# Patient Record
Sex: FEMALE | Race: BLACK OR AFRICAN AMERICAN | Employment: OTHER | URBAN - METROPOLITAN AREA
[De-identification: names, ages, dates, MRNs, and addresses within clinical notes are randomized per-mention and may not be internally consistent; named-entity substitution may affect disease eponyms.]

---

## 2022-05-24 ENCOUNTER — HOSPITAL ENCOUNTER (EMERGENCY)
Age: 27
Discharge: HOME | End: 2022-05-24
Attending: EMERGENCY MEDICINE

## 2022-05-24 VITALS
DIASTOLIC BLOOD PRESSURE: 75 MMHG | SYSTOLIC BLOOD PRESSURE: 122 MMHG | TEMPERATURE: 98.5 F | RESPIRATION RATE: 16 BRPM | BODY MASS INDEX: 28.52 KG/M2 | OXYGEN SATURATION: 99 % | HEART RATE: 118 BPM | HEIGHT: 62 IN | WEIGHT: 155 LBS

## 2022-05-24 DIAGNOSIS — N61.1 BREAST ABSCESS: Primary | ICD-10-CM

## 2022-05-24 PROCEDURE — 6370000000 HC RX 637 (ALT 250 FOR IP): Performed by: EMERGENCY MEDICINE

## 2022-05-24 PROCEDURE — 99283 EMERGENCY DEPT VISIT LOW MDM: CPT

## 2022-05-24 PROCEDURE — 10060 I&D ABSCESS SIMPLE/SINGLE: CPT

## 2022-05-24 RX ORDER — TRAMADOL HYDROCHLORIDE 50 MG/1
50 TABLET ORAL
Status: DISCONTINUED | OUTPATIENT
Start: 2022-05-24 | End: 2022-05-25 | Stop reason: HOSPADM

## 2022-05-24 RX ORDER — SULFAMETHOXAZOLE AND TRIMETHOPRIM 800; 160 MG/1; MG/1
1 TABLET ORAL
Status: DISCONTINUED | OUTPATIENT
Start: 2022-05-24 | End: 2022-05-25 | Stop reason: HOSPADM

## 2022-05-24 RX ORDER — SULFAMETHOXAZOLE AND TRIMETHOPRIM 800; 160 MG/1; MG/1
1 TABLET ORAL
Status: COMPLETED | OUTPATIENT
Start: 2022-05-24 | End: 2022-05-24

## 2022-05-24 RX ORDER — TRAMADOL HYDROCHLORIDE 50 MG/1
50 TABLET ORAL
Status: COMPLETED | OUTPATIENT
Start: 2022-05-24 | End: 2022-05-24

## 2022-05-24 RX ORDER — DICLOFENAC POTASSIUM 50 MG/1
50 TABLET, FILM COATED ORAL 2 TIMES DAILY
Qty: 14 TABLET | Refills: 0 | Status: SHIPPED | OUTPATIENT
Start: 2022-05-24 | End: 2022-05-31

## 2022-05-24 RX ORDER — SULFAMETHOXAZOLE AND TRIMETHOPRIM 800; 160 MG/1; MG/1
1 TABLET ORAL 2 TIMES DAILY
Qty: 14 TABLET | Refills: 0 | Status: SHIPPED | OUTPATIENT
Start: 2022-05-24 | End: 2022-05-31

## 2022-05-24 RX ADMIN — SULFAMETHOXAZOLE AND TRIMETHOPRIM 1 TABLET: 800; 160 TABLET ORAL at 22:23

## 2022-05-24 RX ADMIN — TRAMADOL HYDROCHLORIDE 50 MG: 50 TABLET, COATED ORAL at 22:23

## 2022-05-24 ASSESSMENT — PAIN DESCRIPTION - LOCATION
LOCATION: BREAST
LOCATION: BREAST

## 2022-05-24 ASSESSMENT — PAIN SCALES - GENERAL
PAINLEVEL_OUTOF10: 8

## 2022-05-24 ASSESSMENT — PAIN DESCRIPTION - DESCRIPTORS: DESCRIPTORS: TENDER

## 2022-05-24 ASSESSMENT — PAIN - FUNCTIONAL ASSESSMENT: PAIN_FUNCTIONAL_ASSESSMENT: 0-10

## 2022-05-24 ASSESSMENT — ENCOUNTER SYMPTOMS
VOMITING: 0
ROS SKIN COMMENTS: RIGHT BREAST ABSCESS
NAUSEA: 0
SHORTNESS OF BREATH: 0
COUGH: 0
COLOR CHANGE: 1

## 2022-05-24 ASSESSMENT — PAIN DESCRIPTION - ORIENTATION
ORIENTATION: RIGHT
ORIENTATION: RIGHT

## 2022-05-25 NOTE — PROGRESS NOTES
Lisa Hurtado MD   Bariatric & Advanced Laparoscopic Surgery & Endoscopy  [address] [facility] [address], [address] [facility]  [place]  Phone [phone]   Fax [phone]      Date of visit: 2022      Primary/Requesting provider: MEDARDO Carvajal CNP         Name: Leonardo Morgan      MRN: [id number]       : 1995       Age: 32 y.o. Sex: female        PCP: MEDARDO Carvajal CNP     CC:    Chief Complaint   Patient presents with    New Patient     NP - breast abscess       HPI:     Leonardo Morgan is a 32 y.o. female who presents for evaluation of right breast abscess. She reports going to the ER a 2 days ago. The right breast abscess was partially drained in the ER and she was prescribed oral antibiotics. She reports pain was 10/10 and is now 1/10. She reports minimal drainage from site. No fever. No previous breast abscess. No family history of breast cancer. PMH:    History reviewed. No pertinent past medical history. PSH:    History reviewed. No pertinent surgical history. MEDS:    Current Outpatient Medications   Medication Sig Dispense Refill    ibuprofen (ADVIL;MOTRIN) 200 MG tablet Take 200 mg by mouth every 6 hours as needed for Pain      diclofenac (CATAFLAM) 50 MG tablet Take 1 tablet by mouth 2 times daily for 7 days 14 tablet 0    sulfamethoxazole-trimethoprim (BACTRIM DS;SEPTRA DS) 800-160 MG per tablet Take 1 tablet by mouth 2 times daily for 7 days 14 tablet 0     No current facility-administered medications for this visit. ALLERGIES:      No Known Allergies    SH:    Social History     Tobacco Use    Smoking status: Never Smoker    Smokeless tobacco: Never Used   Vaping Use    Vaping Use: Some days   Substance Use Topics    Alcohol use: Not on file    Drug use: Not on file       FH:    History reviewed. No pertinent family history.     Review of systems:  Review of Systems   Constitutional: Negative for chills, fatigue and fever. HENT: Negative for ear pain, mouth sores and sore throat. Eyes: Negative for discharge and itching. Respiratory: Negative for cough, chest tightness, shortness of breath and wheezing. Cardiovascular: Negative for chest pain and palpitations. Gastrointestinal: Negative for abdominal distention, abdominal pain, blood in stool and nausea. Endocrine: Negative for cold intolerance and heat intolerance. Genitourinary: Negative for hematuria, pelvic pain and vaginal pain. Musculoskeletal: Negative for back pain, joint swelling and neck pain. Skin: Negative for color change and rash. Allergic/Immunologic: Negative for environmental allergies and food allergies. Neurological: Negative for dizziness, weakness and headaches. Hematological: Negative for adenopathy. Psychiatric/Behavioral: Negative for agitation, behavioral problems and confusion. Physical Exam:     /77   Pulse (!) 125   Wt 148 lb 8 oz (67.4 kg)   BMI 27.16 kg/m²     General:  Well-developed, well-nourished, no distress. Psych:  Cooperative, good insight and judgement. Neuro:  Alert, oriented to person, place and time. HEENT:  Normocephalic, atraumatic. Sclera clear. Lungs:  Unlabored breathing. Symmetrical chest expansion. Right lower breast swelling and mild tenderness. There is a small opening and I was able to express about 15 cc of purulent fluid. Wound was dressed. Chest wall:  No tenderness or deformity. Heart:  Regular rate and rhythm. No JVD. Extremities:  Extremities normal, atraumatic, no cyanosis or edema. Skin:  Skin color, texture, turgor normal. No rashes. Diagnosis Orders   1. Abscess of right breast     2. Breast pain, right           Assessment/Plan:  Mirian Morgan is a 32 y.o. female who has signs and symptoms consistent with right breast abscess    Most fluid was drained here in the office.    Patient was instructed to wash area with soap and water and massage area when showering to express any remnant fluid. Continue bactrim until completed. Precautions given in case it worsens  FU in 5 days          Time: I spent 45 minutes preparing to see patient (including chart review and preparation), obtaining and/or reviewing additional medical history, performing a physical exam and evaluation, documenting clinical information in the electronic health record, independently interpreting results, communicating results to patient, family or caregiver, and/or coordinating care.       Signed: Kiara Warren MD  Bariatric & Minimally Invasive Surgery  5/26/2022 5:34 PM

## 2022-05-25 NOTE — ED TRIAGE NOTES
Pt c/o right side breast abscess X1 week. Pt reports she went to Urgent Care today and was told to come here. Pt reports she has a nipple piercing X5 years. Pt masked.

## 2022-05-25 NOTE — ED NOTES
I have reviewed discharge instructions with the patient. The patient verbalized understanding. Patient left ED via Discharge Method: ambulatory to Home with mom    Opportunity for questions and clarification provided. Patient given 2 scripts. To continue your aftercare when you leave the hospital, you may receive an automated call from our care team to check in on how you are doing. This is a free service and part of our promise to provide the best care and service to meet your aftercare needs.  If you have questions, or wish to unsubscribe from this service please call [phone]. Thank you for Choosing our [address] [place] [address] Emergency Department.       Felix Sanabria RN  05/24/22 0216

## 2022-05-26 ENCOUNTER — OFFICE VISIT (OUTPATIENT)
Dept: SURGERY | Age: 27
End: 2022-05-26

## 2022-05-26 VITALS
BODY MASS INDEX: 27.16 KG/M2 | WEIGHT: 148.5 LBS | HEART RATE: 125 BPM | DIASTOLIC BLOOD PRESSURE: 77 MMHG | SYSTOLIC BLOOD PRESSURE: 112 MMHG

## 2022-05-26 DIAGNOSIS — N64.4 BREAST PAIN, RIGHT: ICD-10-CM

## 2022-05-26 DIAGNOSIS — N61.1 ABSCESS OF RIGHT BREAST: Primary | ICD-10-CM

## 2022-05-26 PROCEDURE — 99204 OFFICE O/P NEW MOD 45 MIN: CPT | Performed by: SURGERY

## 2022-05-26 RX ORDER — IBUPROFEN 200 MG
200 TABLET ORAL EVERY 6 HOURS PRN
COMMUNITY

## 2022-05-26 ASSESSMENT — ENCOUNTER SYMPTOMS
ABDOMINAL DISTENTION: 0
SHORTNESS OF BREATH: 0
BACK PAIN: 0
SORE THROAT: 0
COLOR CHANGE: 0
ABDOMINAL PAIN: 0
WHEEZING: 0
NAUSEA: 0
CHEST TIGHTNESS: 0
COUGH: 0
BLOOD IN STOOL: 0
EYE ITCHING: 0
EYE DISCHARGE: 0

## 2022-06-08 ASSESSMENT — ENCOUNTER SYMPTOMS
SHORTNESS OF BREATH: 0
WHEEZING: 0
COLOR CHANGE: 0
EYE ITCHING: 0
CHEST TIGHTNESS: 0
BLOOD IN STOOL: 0
EYE DISCHARGE: 0
NAUSEA: 0
COUGH: 0
ABDOMINAL PAIN: 0
BACK PAIN: 0
ABDOMINAL DISTENTION: 0
SORE THROAT: 0

## 2022-06-08 NOTE — PROGRESS NOTES
Zaki Herrmann MD   Bariatric & Advanced Laparoscopic Surgery & Endoscopy  [address] [facility] [address], [address] [facility]  [place]  Phone [phone]   Fax [phone]      Date of visit: 2022      Primary/Requesting provider: MEDARDO Faustin CNP         Name: Divya Morgan      MRN: [id number]       : 1995       Age: 32 y.o. Sex: female        PCP: MEDARDO Faustin CNP     CC:    Chief Complaint   Patient presents with    Follow-up     FU - Breast abscess        HPI:     Divya Morgan is a 32 y.o. female who presents for evaluation of right breast abscess. She reports going to the ER a 2 days ago. The right breast abscess was partially drained in the ER and she was prescribed oral antibiotics. She reports pain was 10/10 and is now 1/10. She reports minimal drainage from site. No fever. No previous breast abscess. No family history of breast cancer. 22 - She is here for follow up. She completed antibiotic course. She reports wound closed the day after she left clinic. She reports no drainage from site. Pain has improved but she still has some erythema in the area. No fever. PMH:    History reviewed. No pertinent past medical history. PSH:    History reviewed. No pertinent surgical history. MEDS:    Current Outpatient Medications   Medication Sig Dispense Refill    sulfamethoxazole-trimethoprim (BACTRIM DS;SEPTRA DS) 800-160 MG per tablet Take 1 tablet by mouth 2 times daily for 10 days 20 tablet 0    ibuprofen (ADVIL;MOTRIN) 200 MG tablet Take 200 mg by mouth every 6 hours as needed for Pain (Patient not taking: Reported on 2022)      diclofenac (CATAFLAM) 50 MG tablet Take 1 tablet by mouth 2 times daily for 7 days 14 tablet 0     No current facility-administered medications for this visit.         ALLERGIES:      No Known Allergies    SH:    Social History     Tobacco Use    Smoking status: Never Smoker    Smokeless tobacco: Never Used   Vaping Use    Vaping Use: Some days   Substance Use Topics    Alcohol use: Not on file    Drug use: Not on file       FH:    History reviewed. No pertinent family history. Review of systems:  Review of Systems   Constitutional: Negative for chills, fatigue and fever. HENT: Negative for ear pain, mouth sores and sore throat. Eyes: Negative for discharge and itching. Respiratory: Negative for cough, chest tightness, shortness of breath and wheezing. Cardiovascular: Negative for chest pain and palpitations. Gastrointestinal: Negative for abdominal distention, abdominal pain, blood in stool and nausea. Endocrine: Negative for cold intolerance and heat intolerance. Genitourinary: Negative for hematuria, pelvic pain and vaginal pain. Musculoskeletal: Negative for back pain, joint swelling and neck pain. Skin: Negative for color change and rash. Allergic/Immunologic: Negative for environmental allergies and food allergies. Neurological: Negative for dizziness, weakness and headaches. Hematological: Negative for adenopathy. Psychiatric/Behavioral: Negative for agitation, behavioral problems and confusion. Physical Exam:     /66   Pulse (!) 105   Wt 148 lb (67.1 kg)   BMI 27.07 kg/m²     General:  Well-developed, well-nourished, no distress. Psych:  Cooperative, good insight and judgement. Neuro:  Alert, oriented to person, place and time. HEENT:  Normocephalic, atraumatic. Sclera clear. Lungs:  Unlabored breathing. Symmetrical chest expansion. Right breast with erythema in the superiolateral aspect of the nipple with some associated induration. No open wounds. No drainage. Chest wall:  No tenderness or deformity. Heart:  Regular rate and rhythm. No JVD. Extremities:  Extremities normal, atraumatic, no cyanosis or edema. Skin:  Skin color, texture, turgor normal. No rashes. Diagnosis Orders   1.  Abscess of breast, right  US ASP BREAST CYST RIGHT   2. Cellulitis of right breast           Assessment/Plan:  Mirian Morgan is a 32 y.o. female who has signs and symptoms consistent with right breast abscess    Bactrim x 10 days - will be prescribe again  We will send for US and possible aspiration of abscess  RTC in 2 weeks   Precautions given in case it worsens      Time: I spent 30 minutes preparing to see patient (including chart review and preparation), obtaining and/or reviewing additional medical history, performing a physical exam and evaluation, documenting clinical information in the electronic health record, independently interpreting results, communicating results to patient, family or caregiver, and/or coordinating care.       Signed: Rabia Hutchison MD  Bariatric & Minimally Invasive Surgery  6/9/2022 10:21 AM

## 2022-06-09 ENCOUNTER — OFFICE VISIT (OUTPATIENT)
Dept: SURGERY | Age: 27
End: 2022-06-09

## 2022-06-09 ENCOUNTER — HOSPITAL ENCOUNTER (OUTPATIENT)
Dept: MAMMOGRAPHY | Age: 27
Discharge: HOME | End: 2022-06-12

## 2022-06-09 VITALS
DIASTOLIC BLOOD PRESSURE: 66 MMHG | HEART RATE: 105 BPM | WEIGHT: 148 LBS | SYSTOLIC BLOOD PRESSURE: 100 MMHG | BODY MASS INDEX: 27.07 KG/M2

## 2022-06-09 DIAGNOSIS — N61.0 CELLULITIS OF RIGHT BREAST: ICD-10-CM

## 2022-06-09 DIAGNOSIS — N61.1 ABSCESS OF BREAST, RIGHT: Primary | ICD-10-CM

## 2022-06-09 DIAGNOSIS — N61.1 ABSCESS OF BREAST, RIGHT: ICD-10-CM

## 2022-06-09 PROCEDURE — 99214 OFFICE O/P EST MOD 30 MIN: CPT | Performed by: SURGERY

## 2022-06-09 PROCEDURE — 87075 CULTR BACTERIA EXCEPT BLOOD: CPT

## 2022-06-09 PROCEDURE — 87205 SMEAR GRAM STAIN: CPT

## 2022-06-09 PROCEDURE — 87102 FUNGUS ISOLATION CULTURE: CPT

## 2022-06-09 PROCEDURE — 2500000003 HC RX 250 WO HCPCS: Performed by: SURGERY

## 2022-06-09 PROCEDURE — 19000 PUNCTURE ASPIR CYST BREAST: CPT

## 2022-06-09 RX ORDER — SULFAMETHOXAZOLE AND TRIMETHOPRIM 800; 160 MG/1; MG/1
1 TABLET ORAL 2 TIMES DAILY
Qty: 20 TABLET | Refills: 0 | Status: SHIPPED | OUTPATIENT
Start: 2022-06-09 | End: 2022-06-19

## 2022-06-09 RX ORDER — LIDOCAINE HYDROCHLORIDE 10 MG/ML
5 INJECTION, SOLUTION INFILTRATION; PERINEURAL ONCE
Status: COMPLETED | OUTPATIENT
Start: 2022-06-09 | End: 2022-06-09

## 2022-06-09 RX ADMIN — LIDOCAINE HYDROCHLORIDE 5 ML: 10 INJECTION, SOLUTION INFILTRATION; PERINEURAL at 13:15

## 2022-06-12 LAB
BACTERIA SPEC CULT: ABNORMAL
GRAM STN SPEC: ABNORMAL
GRAM STN SPEC: ABNORMAL
SERVICE CMNT-IMP: ABNORMAL

## 2022-06-17 LAB
BACTERIA SPEC CULT: NORMAL
SERVICE CMNT-IMP: NORMAL

## 2022-06-22 ENCOUNTER — OFFICE VISIT (OUTPATIENT)
Dept: SURGERY | Age: 27
End: 2022-06-22

## 2022-06-22 ENCOUNTER — ANESTHESIA EVENT (OUTPATIENT)
Dept: SURGERY | Age: 27
End: 2022-06-22

## 2022-06-22 VITALS
BODY MASS INDEX: 27.4 KG/M2 | SYSTOLIC BLOOD PRESSURE: 125 MMHG | HEART RATE: 96 BPM | WEIGHT: 149.8 LBS | DIASTOLIC BLOOD PRESSURE: 76 MMHG

## 2022-06-22 DIAGNOSIS — N61.0 CELLULITIS OF RIGHT BREAST: ICD-10-CM

## 2022-06-22 DIAGNOSIS — N64.4 BREAST PAIN, RIGHT: ICD-10-CM

## 2022-06-22 DIAGNOSIS — N61.1 ABSCESS OF BREAST, RIGHT: Primary | ICD-10-CM

## 2022-06-22 PROCEDURE — 99215 OFFICE O/P EST HI 40 MIN: CPT | Performed by: SURGERY

## 2022-06-22 RX ORDER — SULFAMETHOXAZOLE AND TRIMETHOPRIM 800; 160 MG/1; MG/1
1 TABLET ORAL 2 TIMES DAILY
COMMUNITY

## 2022-06-22 RX ORDER — CEPHALEXIN 500 MG/1
500 CAPSULE ORAL 4 TIMES DAILY
COMMUNITY

## 2022-06-22 RX ORDER — OXYCODONE HYDROCHLORIDE 5 MG/1
5 TABLET ORAL EVERY 8 HOURS PRN
COMMUNITY

## 2022-06-22 ASSESSMENT — ENCOUNTER SYMPTOMS
NAUSEA: 0
BLOOD IN STOOL: 0
SHORTNESS OF BREATH: 0
ABDOMINAL PAIN: 0
BACK PAIN: 0
ABDOMINAL DISTENTION: 0
EYE DISCHARGE: 0
COUGH: 0
WHEEZING: 0
CHEST TIGHTNESS: 0
SORE THROAT: 0
COLOR CHANGE: 0
EYE ITCHING: 0

## 2022-06-22 NOTE — H&P (VIEW-ONLY)
Emre Carr MD   Bariatric & Advanced Laparoscopic Surgery & Endoscopy  [facility] [facility] [address], [address] [facility]  [place]  Phone [phone]   Fax [phone]      Date of visit: 2022      Primary/Requesting provider: MEDARDO Salcido CNP         Name: Juliana Morgan      MRN: [id number]       : 1995       Age: 32 y.o. Sex: female        PCP: MEDARDO Salcido CNP     CC:    Chief Complaint   Patient presents with    Follow-up     FU - breast abscess       HPI:     Mirian Morgan is a 32 y.o. female who presents for evaluation of right breast abscess. She reports going to the ER a 2 days ago. The right breast abscess was partially drained in the ER and she was prescribed oral antibiotics. She reports pain was 10/10 and is now 1/10. She reports minimal drainage from site. No fever. No previous breast abscess. No family history of breast cancer. 22 - She is here for follow up. She completed antibiotic course. She reports wound closed the day after she left clinic. She reports no drainage from site. Pain has improved but she still has some erythema in the area. No fever. 2022 - Right breast fluid collection was aspirated by radiology. She did not take the antibiotics immediately. She went to urgent care a couple days ago with concern for recurrence. She was found to have a 3 cm fluid collection. They called our office and we scheduled her for outpatient I&D yesterday but when OR called her she had eaten fruits for breakfast. She was rescheduled for today. Today, she reports right breast is red, warm and very tender to palpation. No drainage. PMH:    History reviewed. No pertinent past medical history.     PSH:    Past Surgical History:   Procedure Laterality Date    US BREAST CYST ASPIRATION RIGHT Right 2022    US BREAST CYST ASPIRATION RIGHT 2022 Chata Jauregui MD [facility]       MEDS:    Current Outpatient Medications   Medication Sig Dispense Refill    cephALEXin (KEFLEX) 500 MG capsule Take 500 mg by mouth 4 times daily      oxyCODONE (ROXICODONE) 5 MG immediate release tablet Take 5 mg by mouth every 8 hours as needed for Pain.  sulfamethoxazole-trimethoprim (BACTRIM DS;SEPTRA DS) 800-160 MG per tablet Take 1 tablet by mouth 2 times daily      ibuprofen (ADVIL;MOTRIN) 200 MG tablet Take 200 mg by mouth every 6 hours as needed for Pain (Patient not taking: Reported on 6/9/2022)      diclofenac (CATAFLAM) 50 MG tablet Take 1 tablet by mouth 2 times daily for 7 days 14 tablet 0     No current facility-administered medications for this visit. ALLERGIES:      No Known Allergies    SH:    Social History     Tobacco Use    Smoking status: Never Smoker    Smokeless tobacco: Never Used   Vaping Use    Vaping Use: Some days   Substance Use Topics    Alcohol use: Not on file    Drug use: Not on file       FH:    History reviewed. No pertinent family history. Review of systems:  Review of Systems   Constitutional: Negative for chills, fatigue and fever. HENT: Negative for ear pain, mouth sores and sore throat. Eyes: Negative for discharge and itching. Respiratory: Negative for cough, chest tightness, shortness of breath and wheezing. Cardiovascular: Negative for chest pain and palpitations. Gastrointestinal: Negative for abdominal distention, abdominal pain, blood in stool and nausea. Endocrine: Negative for cold intolerance and heat intolerance. Genitourinary: Negative for hematuria, pelvic pain and vaginal pain. Musculoskeletal: Negative for back pain, joint swelling and neck pain. Skin: Negative for color change and rash. Allergic/Immunologic: Negative for environmental allergies and food allergies. Neurological: Negative for dizziness, weakness and headaches. Hematological: Negative for adenopathy.    Psychiatric/Behavioral: Negative for agitation, behavioral problems and confusion. Physical Exam:     /76   Pulse 96   Wt 149 lb 12.8 oz (67.9 kg)   BMI 27.40 kg/m²     General:  Well-developed, well-nourished, no distress. Psych:  Cooperative, good insight and judgement. Neuro:  Alert, oriented to person, place and time. HEENT:  Normocephalic, atraumatic. Sclera clear. Lungs:  Unlabored breathing. Symmetrical chest expansion. Right breast with erythema present with upper areola fluctuance and tenderness. Chest wall:  No tenderness or deformity. Heart:  Regular rate and rhythm. No JVD. Extremities:  Extremities normal, atraumatic, no cyanosis or edema. Skin:  Skin color, texture, turgor normal. No rashes. Diagnosis Orders   1. Abscess of breast, right     2. Cellulitis of right breast     3. Breast pain, right           Assessment/Plan:  Mirian Morgan is a 32 y.o. female who has signs and symptoms consistent with right breast abscess    Continue Bactrim  Plan for I&D of right breast tomorrow in the OR as she will not tolerate office drainage due to pain  Risks and benefits discussed and she agreed to proceed. Time: I spent 40 minutes preparing to see patient (including chart review and preparation), obtaining and/or reviewing additional medical history, performing a physical exam and evaluation, documenting clinical information in the electronic health record, independently interpreting results, communicating results to patient, family or caregiver, and/or coordinating care.       Signed: Mario Adams MD  Bariatric & Minimally Invasive Surgery  6/22/2022 10:08 AM

## 2022-06-22 NOTE — PERIOP NOTE
Patient verified name and . Order for consent IS found in EHR and matches case posting; patient verifies procedure. Type 1b surgery, phone assessment complete. Orders not received. Labs per surgeon: none  Labs per anesthesia protocol: none    Patient answered medical/surgical history questions at their best of ability. All prior to admission medications documented in [facility]. Patient instructed to take the following medications the day of surgery according to anesthesia guidelines with a small sip of water: Keflex, Bactrim, Oxycodone prn   On the day before surgery please take Acetaminophen 1000mg in the morning and then again before bed. Hold all vitamins 7 days prior to surgery and NSAIDS 5 days prior to surgery. Prescription meds to hold: none  Patient instructed on the following:    > Arrive at [place], time of arrival to be called the day before by 1700  > NPO after midnight including gum, mints, and ice chips  > Responsible adult must drive patient to the hospital, stay during surgery, and patient will need supervision 24 hours after anesthesia  > Use antibacterial soap in shower the night before surgery and on the morning of surgery  > All piercings must be removed prior to arrival.    > Leave all valuables (money and jewelry) at home but bring insurance card and ID on DOS.   > You may be required to pay a deductible or co-pay on the day of your procedure. You can pre-pay by calling [phone] if your surgery is at the [facility] or [phone] if your surgery is at the [facility]. > Do not wear make-up, nail polish, lotions, cologne, perfumes, powders, or oil on skin. Artificial nails are not permitted.

## 2022-06-22 NOTE — PROGRESS NOTES
Marah Mckeon MD   Bariatric & Advanced Laparoscopic Surgery & Endoscopy  [address] [facility] [address], [address] [facility]  [place]  Phone [phone]   Fax [phone]      Date of visit: 2022      Primary/Requesting provider: MEDARDO Gonzalez CNP         Name: Ronny Morgan      MRN: [id number]       : 1995       Age: 32 y.o. Sex: female        PCP: MEDARDO Gonzalez CNP     CC:    Chief Complaint   Patient presents with    Follow-up     FU - breast abscess       HPI:     Mirian Morgan is a 32 y.o. female who presents for evaluation of right breast abscess. She reports going to the ER a 2 days ago. The right breast abscess was partially drained in the ER and she was prescribed oral antibiotics. She reports pain was 10/10 and is now 1/10. She reports minimal drainage from site. No fever. No previous breast abscess. No family history of breast cancer. 22 - She is here for follow up. She completed antibiotic course. She reports wound closed the day after she left clinic. She reports no drainage from site. Pain has improved but she still has some erythema in the area. No fever. 2022 - Right breast fluid collection was aspirated by radiology. She did not take the antibiotics immediately. She went to urgent care a couple days ago with concern for recurrence. She was found to have a 3 cm fluid collection. They called our office and we scheduled her for outpatient I&D yesterday but when OR called her she had eaten fruits for breakfast. She was rescheduled for today. Today, she reports right breast is red, warm and very tender to palpation. No drainage. PMH:    History reviewed. No pertinent past medical history.     PSH:    Past Surgical History:   Procedure Laterality Date    US BREAST CYST ASPIRATION RIGHT Right 2022    US BREAST CYST ASPIRATION RIGHT 2022 Hillary Ku MD [facility]       MEDS:    Current Outpatient Medications   Medication Sig Dispense Refill    cephALEXin (KEFLEX) 500 MG capsule Take 500 mg by mouth 4 times daily      oxyCODONE (ROXICODONE) 5 MG immediate release tablet Take 5 mg by mouth every 8 hours as needed for Pain.  sulfamethoxazole-trimethoprim (BACTRIM DS;SEPTRA DS) 800-160 MG per tablet Take 1 tablet by mouth 2 times daily      ibuprofen (ADVIL;MOTRIN) 200 MG tablet Take 200 mg by mouth every 6 hours as needed for Pain (Patient not taking: Reported on 6/9/2022)      diclofenac (CATAFLAM) 50 MG tablet Take 1 tablet by mouth 2 times daily for 7 days 14 tablet 0     No current facility-administered medications for this visit. ALLERGIES:      No Known Allergies    SH:    Social History     Tobacco Use    Smoking status: Never Smoker    Smokeless tobacco: Never Used   Vaping Use    Vaping Use: Some days   Substance Use Topics    Alcohol use: Not on file    Drug use: Not on file       FH:    History reviewed. No pertinent family history. Review of systems:  Review of Systems   Constitutional: Negative for chills, fatigue and fever. HENT: Negative for ear pain, mouth sores and sore throat. Eyes: Negative for discharge and itching. Respiratory: Negative for cough, chest tightness, shortness of breath and wheezing. Cardiovascular: Negative for chest pain and palpitations. Gastrointestinal: Negative for abdominal distention, abdominal pain, blood in stool and nausea. Endocrine: Negative for cold intolerance and heat intolerance. Genitourinary: Negative for hematuria, pelvic pain and vaginal pain. Musculoskeletal: Negative for back pain, joint swelling and neck pain. Skin: Negative for color change and rash. Allergic/Immunologic: Negative for environmental allergies and food allergies. Neurological: Negative for dizziness, weakness and headaches. Hematological: Negative for adenopathy.    Psychiatric/Behavioral: Negative for agitation, behavioral problems and confusion. Physical Exam:     /76   Pulse 96   Wt 149 lb 12.8 oz (67.9 kg)   BMI 27.40 kg/m²     General:  Well-developed, well-nourished, no distress. Psych:  Cooperative, good insight and judgement. Neuro:  Alert, oriented to person, place and time. HEENT:  Normocephalic, atraumatic. Sclera clear. Lungs:  Unlabored breathing. Symmetrical chest expansion. Right breast with erythema present with upper areola fluctuance and tenderness. Chest wall:  No tenderness or deformity. Heart:  Regular rate and rhythm. No JVD. Extremities:  Extremities normal, atraumatic, no cyanosis or edema. Skin:  Skin color, texture, turgor normal. No rashes. Diagnosis Orders   1. Abscess of breast, right     2. Cellulitis of right breast     3. Breast pain, right           Assessment/Plan:  Mirian Morgan is a 32 y.o. female who has signs and symptoms consistent with right breast abscess    Continue Bactrim  Plan for I&D of right breast tomorrow in the OR as she will not tolerate office drainage due to pain  Risks and benefits discussed and she agreed to proceed. Time: I spent 40 minutes preparing to see patient (including chart review and preparation), obtaining and/or reviewing additional medical history, performing a physical exam and evaluation, documenting clinical information in the electronic health record, independently interpreting results, communicating results to patient, family or caregiver, and/or coordinating care.       Signed: Billy Braun MD  Bariatric & Minimally Invasive Surgery  6/22/2022 10:08 AM

## 2022-06-23 ENCOUNTER — HOSPITAL ENCOUNTER (OUTPATIENT)
Age: 27
Setting detail: OUTPATIENT SURGERY
Discharge: HOME | End: 2022-06-23
Attending: SURGERY | Admitting: SURGERY

## 2022-06-23 ENCOUNTER — ANESTHESIA (OUTPATIENT)
Dept: SURGERY | Age: 27
End: 2022-06-23

## 2022-06-23 VITALS
WEIGHT: 147.6 LBS | OXYGEN SATURATION: 99 % | DIASTOLIC BLOOD PRESSURE: 65 MMHG | SYSTOLIC BLOOD PRESSURE: 119 MMHG | BODY MASS INDEX: 27.16 KG/M2 | HEART RATE: 81 BPM | HEIGHT: 62 IN | RESPIRATION RATE: 16 BRPM | TEMPERATURE: 97.6 F

## 2022-06-23 DIAGNOSIS — N61.1 ABSCESS OF RIGHT BREAST: Primary | ICD-10-CM

## 2022-06-23 LAB — HCG UR QL: NEGATIVE

## 2022-06-23 PROCEDURE — 6370000000 HC RX 637 (ALT 250 FOR IP): Performed by: ANESTHESIOLOGY

## 2022-06-23 PROCEDURE — 19020 MASTOTOMY EXPL DRG ABSC DP: CPT | Performed by: SURGERY

## 2022-06-23 PROCEDURE — 7100000010 HC PHASE II RECOVERY - FIRST 15 MIN: Performed by: SURGERY

## 2022-06-23 PROCEDURE — 6360000002 HC RX W HCPCS: Performed by: ANESTHESIOLOGY

## 2022-06-23 PROCEDURE — 7100000001 HC PACU RECOVERY - ADDTL 15 MIN: Performed by: SURGERY

## 2022-06-23 PROCEDURE — 2709999900 HC NON-CHARGEABLE SUPPLY: Performed by: SURGERY

## 2022-06-23 PROCEDURE — 3700000001 HC ADD 15 MINUTES (ANESTHESIA): Performed by: SURGERY

## 2022-06-23 PROCEDURE — 7100000000 HC PACU RECOVERY - FIRST 15 MIN: Performed by: SURGERY

## 2022-06-23 PROCEDURE — 6360000002 HC RX W HCPCS: Performed by: SURGERY

## 2022-06-23 PROCEDURE — 2580000003 HC RX 258: Performed by: ANESTHESIOLOGY

## 2022-06-23 PROCEDURE — 81025 URINE PREGNANCY TEST: CPT

## 2022-06-23 PROCEDURE — 3600000012 HC SURGERY LEVEL 2 ADDTL 15MIN: Performed by: SURGERY

## 2022-06-23 PROCEDURE — 6360000002 HC RX W HCPCS: Performed by: NURSE ANESTHETIST, CERTIFIED REGISTERED

## 2022-06-23 PROCEDURE — 7100000011 HC PHASE II RECOVERY - ADDTL 15 MIN: Performed by: SURGERY

## 2022-06-23 PROCEDURE — 3700000000 HC ANESTHESIA ATTENDED CARE: Performed by: SURGERY

## 2022-06-23 PROCEDURE — 3600000002 HC SURGERY LEVEL 2 BASE: Performed by: SURGERY

## 2022-06-23 RX ORDER — DEXAMETHASONE SODIUM PHOSPHATE 4 MG/ML
INJECTION, SOLUTION INTRA-ARTICULAR; INTRALESIONAL; INTRAMUSCULAR; INTRAVENOUS; SOFT TISSUE PRN
Status: DISCONTINUED | OUTPATIENT
Start: 2022-06-23 | End: 2022-06-23 | Stop reason: SDUPTHER

## 2022-06-23 RX ORDER — ONDANSETRON 2 MG/ML
4 INJECTION INTRAMUSCULAR; INTRAVENOUS
Status: DISCONTINUED | OUTPATIENT
Start: 2022-06-23 | End: 2022-06-23 | Stop reason: HOSPADM

## 2022-06-23 RX ORDER — ACETAMINOPHEN 500 MG
1000 TABLET ORAL ONCE
Status: COMPLETED | OUTPATIENT
Start: 2022-06-23 | End: 2022-06-23

## 2022-06-23 RX ORDER — PROPOFOL 10 MG/ML
INJECTION, EMULSION INTRAVENOUS PRN
Status: DISCONTINUED | OUTPATIENT
Start: 2022-06-23 | End: 2022-06-23 | Stop reason: SDUPTHER

## 2022-06-23 RX ORDER — OXYCODONE HYDROCHLORIDE 5 MG/1
10 TABLET ORAL PRN
Status: COMPLETED | OUTPATIENT
Start: 2022-06-23 | End: 2022-06-23

## 2022-06-23 RX ORDER — OXYCODONE HYDROCHLORIDE 5 MG/1
5 TABLET ORAL PRN
Status: COMPLETED | OUTPATIENT
Start: 2022-06-23 | End: 2022-06-23

## 2022-06-23 RX ORDER — HYDROMORPHONE HYDROCHLORIDE 1 MG/ML
0.5 INJECTION, SOLUTION INTRAMUSCULAR; INTRAVENOUS; SUBCUTANEOUS EVERY 5 MIN PRN
Status: DISCONTINUED | OUTPATIENT
Start: 2022-06-23 | End: 2022-06-23 | Stop reason: HOSPADM

## 2022-06-23 RX ORDER — OXYCODONE HYDROCHLORIDE AND ACETAMINOPHEN 5; 325 MG/1; MG/1
1 TABLET ORAL EVERY 6 HOURS PRN
Qty: 28 TABLET | Refills: 0 | Status: SHIPPED | OUTPATIENT
Start: 2022-06-23 | End: 2022-06-30

## 2022-06-23 RX ORDER — SODIUM CHLORIDE 0.9 % (FLUSH) 0.9 %
5-40 SYRINGE (ML) INJECTION EVERY 12 HOURS SCHEDULED
Status: DISCONTINUED | OUTPATIENT
Start: 2022-06-23 | End: 2022-06-23 | Stop reason: HOSPADM

## 2022-06-23 RX ORDER — SODIUM CHLORIDE 9 MG/ML
INJECTION, SOLUTION INTRAVENOUS PRN
Status: DISCONTINUED | OUTPATIENT
Start: 2022-06-23 | End: 2022-06-23 | Stop reason: HOSPADM

## 2022-06-23 RX ORDER — SODIUM CHLORIDE 0.9 % (FLUSH) 0.9 %
5-40 SYRINGE (ML) INJECTION PRN
Status: DISCONTINUED | OUTPATIENT
Start: 2022-06-23 | End: 2022-06-23 | Stop reason: HOSPADM

## 2022-06-23 RX ORDER — LIDOCAINE HYDROCHLORIDE 10 MG/ML
1 INJECTION, SOLUTION INFILTRATION; PERINEURAL
Status: DISCONTINUED | OUTPATIENT
Start: 2022-06-23 | End: 2022-06-23 | Stop reason: HOSPADM

## 2022-06-23 RX ORDER — ONDANSETRON 2 MG/ML
INJECTION INTRAMUSCULAR; INTRAVENOUS PRN
Status: DISCONTINUED | OUTPATIENT
Start: 2022-06-23 | End: 2022-06-23 | Stop reason: SDUPTHER

## 2022-06-23 RX ORDER — SODIUM CHLORIDE 0.9 % (FLUSH) 0.9 %
5-40 SYRINGE (ML) INJECTION PRN
Status: DISCONTINUED | OUTPATIENT
Start: 2022-06-23 | End: 2022-06-23 | Stop reason: SDUPTHER

## 2022-06-23 RX ORDER — FENTANYL CITRATE 50 UG/ML
INJECTION, SOLUTION INTRAMUSCULAR; INTRAVENOUS PRN
Status: DISCONTINUED | OUTPATIENT
Start: 2022-06-23 | End: 2022-06-23 | Stop reason: SDUPTHER

## 2022-06-23 RX ORDER — KETOROLAC TROMETHAMINE 30 MG/ML
INJECTION, SOLUTION INTRAMUSCULAR; INTRAVENOUS PRN
Status: DISCONTINUED | OUTPATIENT
Start: 2022-06-23 | End: 2022-06-23 | Stop reason: SDUPTHER

## 2022-06-23 RX ORDER — SODIUM CHLORIDE 9 MG/ML
INJECTION, SOLUTION INTRAVENOUS PRN
Status: DISCONTINUED | OUTPATIENT
Start: 2022-06-23 | End: 2022-06-23 | Stop reason: SDUPTHER

## 2022-06-23 RX ORDER — MIDAZOLAM HYDROCHLORIDE 2 MG/2ML
2 INJECTION, SOLUTION INTRAMUSCULAR; INTRAVENOUS
Status: COMPLETED | OUTPATIENT
Start: 2022-06-23 | End: 2022-06-23

## 2022-06-23 RX ORDER — SODIUM CHLORIDE 0.9 % (FLUSH) 0.9 %
5-40 SYRINGE (ML) INJECTION EVERY 12 HOURS SCHEDULED
Status: DISCONTINUED | OUTPATIENT
Start: 2022-06-23 | End: 2022-06-23 | Stop reason: SDUPTHER

## 2022-06-23 RX ORDER — SODIUM CHLORIDE, SODIUM LACTATE, POTASSIUM CHLORIDE, CALCIUM CHLORIDE 600; 310; 30; 20 MG/100ML; MG/100ML; MG/100ML; MG/100ML
INJECTION, SOLUTION INTRAVENOUS CONTINUOUS
Status: DISCONTINUED | OUTPATIENT
Start: 2022-06-23 | End: 2022-06-23 | Stop reason: HOSPADM

## 2022-06-23 RX ORDER — HYDROMORPHONE HYDROCHLORIDE 1 MG/ML
0.25 INJECTION, SOLUTION INTRAMUSCULAR; INTRAVENOUS; SUBCUTANEOUS EVERY 5 MIN PRN
Status: DISCONTINUED | OUTPATIENT
Start: 2022-06-23 | End: 2022-06-23 | Stop reason: HOSPADM

## 2022-06-23 RX ADMIN — DEXAMETHASONE SODIUM PHOSPHATE 4 MG: 4 INJECTION, SOLUTION INTRAMUSCULAR; INTRAVENOUS at 12:06

## 2022-06-23 RX ADMIN — FENTANYL CITRATE 100 MCG: 50 INJECTION INTRAMUSCULAR; INTRAVENOUS at 11:59

## 2022-06-23 RX ADMIN — OXYCODONE 5 MG: 5 TABLET ORAL at 12:53

## 2022-06-23 RX ADMIN — ONDANSETRON 4 MG: 2 INJECTION INTRAMUSCULAR; INTRAVENOUS at 12:06

## 2022-06-23 RX ADMIN — KETOROLAC TROMETHAMINE 30 MG: 30 INJECTION, SOLUTION INTRAMUSCULAR; INTRAVENOUS at 12:15

## 2022-06-23 RX ADMIN — PROPOFOL 150 MG: 10 INJECTION, EMULSION INTRAVENOUS at 11:59

## 2022-06-23 RX ADMIN — Medication 2000 MG: at 11:53

## 2022-06-23 RX ADMIN — ACETAMINOPHEN 1000 MG: 500 TABLET, FILM COATED ORAL at 11:46

## 2022-06-23 RX ADMIN — MIDAZOLAM 2 MG: 1 INJECTION INTRAMUSCULAR; INTRAVENOUS at 11:45

## 2022-06-23 RX ADMIN — SODIUM CHLORIDE, SODIUM LACTATE, POTASSIUM CHLORIDE, AND CALCIUM CHLORIDE: 600; 310; 30; 20 INJECTION, SOLUTION INTRAVENOUS at 11:46

## 2022-06-23 ASSESSMENT — PAIN SCALES - GENERAL
PAINLEVEL_OUTOF10: 4
PAINLEVEL_OUTOF10: 3

## 2022-06-23 ASSESSMENT — PAIN DESCRIPTION - ORIENTATION
ORIENTATION: RIGHT
ORIENTATION: RIGHT

## 2022-06-23 ASSESSMENT — PAIN DESCRIPTION - LOCATION
LOCATION: BREAST
LOCATION: BREAST

## 2022-06-23 ASSESSMENT — PAIN DESCRIPTION - DESCRIPTORS
DESCRIPTORS: ACHING;BURNING
DESCRIPTORS: ACHING;BURNING
DESCRIPTORS: ACHING

## 2022-06-23 ASSESSMENT — PAIN - FUNCTIONAL ASSESSMENT: PAIN_FUNCTIONAL_ASSESSMENT: 0-10

## 2022-06-23 NOTE — ANESTHESIA PRE PROCEDURE
Department of Anesthesiology  Preprocedure Note       Name:  Laurita Morgan   Age:  32 y.o.  :  1995                                          MRN:  [id number]         Date:  2022      Surgeon: Priscila Moyer):  Julius Harris MD    Procedure: Procedure(s):  RIGHT BREAST ABSCES  INCISION AND DRAINAGE    Medications prior to admission:   Prior to Admission medications    Medication Sig Start Date End Date Taking? Authorizing Provider   cephALEXin (KEFLEX) 500 MG capsule Take 500 mg by mouth 4 times daily    Historical Provider, MD   oxyCODONE (ROXICODONE) 5 MG immediate release tablet Take 5 mg by mouth every 8 hours as needed for Pain.     Historical Provider, MD   sulfamethoxazole-trimethoprim (BACTRIM DS;SEPTRA DS) 800-160 MG per tablet Take 1 tablet by mouth 2 times daily    Historical Provider, MD   ibuprofen (ADVIL;MOTRIN) 200 MG tablet Take 200 mg by mouth every 6 hours as needed for Pain     Historical Provider, MD   diclofenac (CATAFLAM) 50 MG tablet Take 1 tablet by mouth 2 times daily for 7 days 22  Jamie Timmons MD       Current medications:    Current Facility-Administered Medications   Medication Dose Route Frequency Provider Last Rate Last Admin    0.9 % sodium chloride infusion   IntraVENous PRN MACARENA John MD        acetaminophen (TYLENOL) tablet 1,000 mg  1,000 mg Oral Once MACARENA John MD        lactated ringers infusion   IntraVENous Continuous MACARENA John MD        lidocaine 1 % injection 1 mL  1 mL IntraDERmal Once PRN MACARENA John MD        midazolam PF (VERSED) injection 2 mg  2 mg IntraVENous Once PRN MACARENA John MD        sodium chloride flush 0.9 % injection 5-40 mL  5-40 mL IntraVENous 2 times per day MACARENA John MD        sodium chloride flush 0.9 % injection 5-40 mL  5-40 mL IntraVENous PRN MACARENA John MD        ceFAZolin (ANCEF) 2000 mg in sterile water 20 mL IV syringe  2,000 mg IntraVENous On Call to Gerson Byrd MD           Allergies:  No Known Allergies    Problem List:  There is no problem list on file for this patient. Past Medical History:  History reviewed. No pertinent past medical history. Past Surgical History:        Procedure Laterality Date    US BREAST CYST ASPIRATION RIGHT Right 6/9/2022    US BREAST CYST ASPIRATION RIGHT 6/9/2022 Rubina Morse MD [facility]       Social History:    Social History     Tobacco Use    Smoking status: Never Smoker    Smokeless tobacco: Never Used   Substance Use Topics    Alcohol use: Not Currently                                Counseling given: Not Answered      Vital Signs (Current):   Vitals:    06/23/22 1022 06/23/22 1032   BP:  124/76   Pulse:  (!) 104   Resp:  16   Temp:  97.6 °F (36.4 °C)   TempSrc: Temporal Temporal   SpO2:  93%   Weight:  147 lb 9.6 oz (67 kg)   Height:  5' 2\" (1.575 m)                                              BP Readings from Last 3 Encounters:   06/23/22 124/76   06/22/22 125/76   06/09/22 100/66       NPO Status: Time of last liquid consumption: 2300 (sips water with meds)                        Time of last solid consumption: 2300                        Date of last liquid consumption: 06/22/22                        Date of last solid food consumption: 06/22/22    BMI:   Wt Readings from Last 3 Encounters:   06/23/22 147 lb 9.6 oz (67 kg)   06/22/22 149 lb 12.8 oz (67.9 kg)   06/09/22 148 lb (67.1 kg)     Body mass index is 27 kg/m². CBC: No results found for: WBC, RBC, HGB, HCT, MCV, RDW, PLT    CMP: No results found for: NA, K, CL, CO2, BUN, CREATININE, GFRAA, AGRATIO, LABGLOM, GLUCOSE, GLU, PROT, CALCIUM, BILITOT, ALKPHOS, AST, ALT    POC Tests: No results for input(s): POCGLU, POCNA, POCK, POCCL, POCBUN, POCHEMO, POCHCT in the last 72 hours.     Coags: No results found for: PROTIME, INR, APTT    HCG (If Applicable): No results found for: PREGTESTUR, PREGSERUM, HCG, HCGQUANT     ABGs: No results found for: PHART, PO2ART, PKG7UTT, ONG2YXX, BEART, Q1IYTZMK     Type & Screen (If Applicable):  No results found for: LABABO, LABRH    Drug/Infectious Status (If Applicable):  No results found for: HIV, HEPCAB    COVID-19 Screening (If Applicable): No results found for: COVID19        Anesthesia Evaluation  Patient summary reviewed and Nursing notes reviewed  Airway: Mallampati: II  TM distance: >3 FB   Neck ROM: full     Dental:          Pulmonary:Negative Pulmonary ROS and normal exam                               Cardiovascular:  Exercise tolerance: good (>4 METS),           Rhythm: regular  Rate: normal                    Neuro/Psych:   Negative Neuro/Psych ROS              GI/Hepatic/Renal: Neg GI/Hepatic/Renal ROS            Endo/Other: Negative Endo/Other ROS             Pt had no PAT visit       Abdominal:             Vascular: negative vascular ROS. Other Findings:           Anesthesia Plan      general     ASA 1       Induction: intravenous. Anesthetic plan and risks discussed with patient.       Plan discussed with surgical team.                    Marco Witt MD   6/23/2022

## 2022-06-23 NOTE — ANESTHESIA POSTPROCEDURE EVALUATION
Department of Anesthesiology  Postprocedure Note    Patient: Neal Morgan  MRN: [id number]  YOB: 1995  Date of evaluation: 6/23/2022      Procedure Summary     Date: 06/23/22 Room / Location: [facility] MAIN OR  / [facility] MAIN OR    Anesthesia Start: 1152 Anesthesia Stop: 1229    Procedure: RIGHT BREAST ABSCES  INCISION AND DRAINAGE (Right ) Diagnosis:       Abscess of breast      (Abscess of breast [N61.1])    Surgeons: Ministerio Pascual MD Responsible Provider:     Anesthesia Type: general ASA Status: 1          Anesthesia Type: No value filed.     Raghavendra Phase I: Raghavendra Score: 5    Raghavendra Phase II:        Anesthesia Post Evaluation    Patient location during evaluation: PACU  Patient participation: complete - patient participated  Level of consciousness: awake and awake and alert  Airway patency: patent  Nausea & Vomiting: no nausea  Complications: no  Cardiovascular status: hemodynamically stable  Hydration status: euvolemic

## 2022-06-23 NOTE — INTERVAL H&P NOTE
Update History & Physical    The patient's History and Physical of June 22, I&D right  breast abscess was reviewed with the patient and I examined the patient. There was no change. The surgical site was confirmed by the patient and me. Plan: The risks, benefits, expected outcome, and alternative to the recommended procedure have been discussed with the patient. Patient understands and wants to proceed with the procedure.      Electronically signed by Clarence Fernando MD on 6/23/2022 at 11:45 AM

## 2022-06-23 NOTE — OP NOTE
Operative Note      Patient: Essence Morgan  YOB: 1995  MRN: [id number]    Date of Procedure: 6/23/2022    Pre-Op Diagnosis: Abscess of breast [N61.1]    Post-Op Diagnosis: Same       Procedure(s):  RIGHT BREAST ABSCES  INCISION AND DRAINAGE    Surgeon(s):  Neno Mejia MD    Assistant:   * No surgical staff found *    Anesthesia: General    Estimated Blood Loss (mL): less than 50     Complications: None    Specimens:   * No specimens in log *    Implants:  * No implants in log *      Drains: * No LDAs found *    Findings: moderate size abscess posterior to the nipple    Detailed Description of Procedure:   After informed consent was taken, patient was taken to the operating room and placed in supine position. Anesthesia was induced. Patient received preoperative antibiotics. Right breast was prepped and draped in standard sterile fashion. A timeout was performed with all team member present. A 3 cm periareaolar incision was made in the upper medial border extending the already present draining opening. The incision was carried down to the abscess cavity. The cavity was suctioned and irrigated copiously with saline. Hemostasis was achieved with cautery. Wound was packed with 1 inch iodoform packing. The patient tolerated the procedure well and there were no immediate complications. The patient was awakened from anesthesia, transferred to a stretcher, and transported to the PACU in good condition. The remaining packing was given to patient for future wound dressings.          Signed by: Neno Mejia MD  [id number] [facility]  6/23/2022 12:28 PM

## 2022-06-24 ENCOUNTER — TELEPHONE (OUTPATIENT)
Dept: SURGERY | Age: 27
End: 2022-06-24

## 2022-06-24 NOTE — TELEPHONE ENCOUNTER
Pt is S/p I&D right breast 6/23/22 by Dr. Franklin Aragon. Phone call from pt stating she is afraid to remove packing from breast due to post op incisional pain. Dressing change procedure reviewed with pt. Pt verbalized an understanding and is now willing to preform dressing change - will remove packing a re-pack wound. Pt has f/u appt scheduled. Pt to call again for questions/ concerns.        Bahman Weeks NP

## 2022-06-28 ENCOUNTER — OFFICE VISIT (OUTPATIENT)
Dept: SURGERY | Age: 27
End: 2022-06-28

## 2022-06-28 DIAGNOSIS — Z48.89 ENCOUNTER FOR POST SURGICAL WOUND CHECK: Primary | ICD-10-CM

## 2022-06-28 PROCEDURE — 99024 POSTOP FOLLOW-UP VISIT: CPT | Performed by: SURGERY

## 2022-06-28 ASSESSMENT — ENCOUNTER SYMPTOMS
SHORTNESS OF BREATH: 0
ABDOMINAL PAIN: 0
CONSTIPATION: 0
DIARRHEA: 0
NAUSEA: 0
VOMITING: 0

## 2022-06-28 NOTE — PROGRESS NOTES
Juanito Chaney MD   B[facility]ric [facility]  [address] [place] [address]  Ezekiel Pierce  Phone [phone]   Fax [phone]      poDate: 2022    Name: Karissa Morgan      MRN: [id number]       : 1995       Age: 32 y.o. Sex: female        Laura Dimes, APRN - CNP     CC:  No chief complaint on file. HPI:  The patient presents for a post-op visit s/p I&D right breast on 2022. she is doing well and has no major complaints. Patient is tolerating diet. Area feels much better    ROS:   Review of Systems   Constitutional: Negative for chills and fever. Respiratory: Negative for shortness of breath. Gastrointestinal: Negative for abdominal pain, constipation, diarrhea, nausea and vomiting. Physical Exam:     LMP 2022     General: Alert, oriented, cooperative and in no acute distress. Right breast wound packing in place. Cellulitis improved. Induration improved. Wound clean. Assessment/Plan:  Mirian Morgan is a 32 y.o. female who is s/p I&D right breast.     1. Follow-up in 3 week(s)    2. Return to full activity in 4 weeks    3.  Regular diet    Signed: Juanito Chaney MD  [facility]  2022 1:00 PM

## 2022-07-09 LAB
FUNGAL CULT/SMEAR: NORMAL
FUNGUS (MYCOLOGY) CULTURE: NEGATIVE
FUNGUS SMEAR: NORMAL
REFLEX TO ID: NORMAL
SPECIMEN PROCESSING: NORMAL
SPECIMEN SOURCE: NORMAL
SPECIMEN SOURCE: NORMAL

## 2022-07-26 NOTE — PROGRESS NOTES
Char Harris MD   B[facility]ric [facility] End[facility]  [address] [place] [address] 2050, [address] [facility]  Ezekiel Pierce  Phone [phone]   Fax [phone]      poDate: 2022    Name: Kristy Morgan      MRN: [id number]       : 1995       Age: 32 y.o. Sex: female        Vasquez Push, APRN - CNP     CC:    Chief Complaint   Patient presents with    Post-Op Check     GPO - 2022 - I&D breast abscess       HPI:  The patient presents for a post-op visit s/p I&D right breast on 2022. she is doing well and has no major complaints. Patient is tolerating diet. Area feels much better    2022 - she is here for follow up. She is doing very well and reports no issues with right breast. She is 6 weeks pregnant today. ROS:   Review of Systems   Constitutional:  Negative for chills and fever. Respiratory:  Negative for shortness of breath. Gastrointestinal:  Negative for abdominal pain, constipation, diarrhea, nausea and vomiting. Physical Exam:     There were no vitals taken for this visit. General: Alert, oriented, cooperative and in no acute distress. Right breast wound is healed. No erythema. No drainage. Assessment/Plan:  Mirian Morgan is a 32 y.o. female who is s/p I&D right breast.     1. Follow-up  PRN    2. Return to full activity     3.  Regular diet    Signed: Char Harris MD  [facility]  2022 10:32 AM

## 2022-07-27 ENCOUNTER — OFFICE VISIT (OUTPATIENT)
Dept: SURGERY | Age: 27
End: 2022-07-27

## 2022-07-27 DIAGNOSIS — Z48.89 ENCOUNTER FOR POST SURGICAL WOUND CHECK: Primary | ICD-10-CM

## 2022-07-27 PROCEDURE — 99024 POSTOP FOLLOW-UP VISIT: CPT | Performed by: SURGERY

## 2022-07-27 ASSESSMENT — ENCOUNTER SYMPTOMS
CONSTIPATION: 0
VOMITING: 0
ABDOMINAL PAIN: 0
NAUSEA: 0
SHORTNESS OF BREATH: 0
DIARRHEA: 0

## 2023-09-12 ENCOUNTER — TELEPHONE (OUTPATIENT)
Dept: PRIMARY CARE CLINIC | Facility: CLINIC | Age: 28
End: 2023-09-12

## 2023-09-12 NOTE — TELEPHONE ENCOUNTER
----- Message from Stone Clark sent at 9/12/2023 11:11 AM EDT -----  Subject: Appointment Request    Reason for Call: New Patient/New to Provider Appointment needed: New   Patient Request Appointment    QUESTIONS    Reason for appointment request? Requested Provider unavailable - Marisol     Additional Information for Provider? Patient needs to establish care and   get physical from [facility], willing to do any day and prefer mornings.    Please call back with appt.  ---------------------------------------------------------------------------  --------------  Davina DEL CASTILLO  [phone]; OK to leave message on voicemail  ---------------------------------------------------------------------------  --------------  SCRIPT ANSWERS

## (undated) DEVICE — CANISTER, RIGID, 2000CC: Brand: MEDLINE INDUSTRIES, INC.

## (undated) DEVICE — 3M™ TEGADERM™ TRANSPARENT FILM DRESSING FRAME STYLE, 1626W, 4 IN X 4-3/4 IN (10 CM X 12 CM), 50/CT 4CT/CASE: Brand: 3M™ TEGADERM™

## (undated) DEVICE — GARMENT,MEDLINE,DVT,INT,CALF,MED, GEN2: Brand: MEDLINE

## (undated) DEVICE — GLOVE SURG SZ 7 L12IN FNGR THK79MIL GRN LTX FREE

## (undated) DEVICE — GLOVE SURG SZ 65 THK91MIL LTX FREE SYN POLYISOPRENE

## (undated) DEVICE — GAUZE,SPONGE,4"X4",16PLY,STRL,LF,10/TRAY: Brand: MEDLINE

## (undated) DEVICE — APPLICATOR MEDICATED 26 CC SOLUTION HI LT ORNG CHLORAPREP

## (undated) DEVICE — GAUZE,PACKING STRIP,IODOFORM,1"X5YD,STRL: Brand: CURAD

## (undated) DEVICE — MINOR SPLIT GENERAL: Brand: MEDLINE INDUSTRIES, INC.